# Patient Record
Sex: MALE | Race: OTHER | HISPANIC OR LATINO | URBAN - METROPOLITAN AREA
[De-identification: names, ages, dates, MRNs, and addresses within clinical notes are randomized per-mention and may not be internally consistent; named-entity substitution may affect disease eponyms.]

---

## 2019-07-09 ENCOUNTER — EMERGENCY (EMERGENCY)
Facility: HOSPITAL | Age: 19
LOS: 1 days | Discharge: HOME | End: 2019-07-09
Attending: INTERNAL MEDICINE | Admitting: INTERNAL MEDICINE
Payer: MEDICAID

## 2019-07-09 VITALS
DIASTOLIC BLOOD PRESSURE: 77 MMHG | WEIGHT: 172.18 LBS | OXYGEN SATURATION: 100 % | HEART RATE: 63 BPM | TEMPERATURE: 98 F | HEIGHT: 65 IN | SYSTOLIC BLOOD PRESSURE: 129 MMHG | RESPIRATION RATE: 18 BRPM

## 2019-07-09 DIAGNOSIS — R07.81 PLEURODYNIA: ICD-10-CM

## 2019-07-09 LAB
ALBUMIN SERPL ELPH-MCNC: 4.1 G/DL — SIGNIFICANT CHANGE UP (ref 3.3–5)
ALP SERPL-CCNC: 67 U/L — SIGNIFICANT CHANGE UP (ref 40–120)
ALT FLD-CCNC: 24 U/L DA — SIGNIFICANT CHANGE UP (ref 10–45)
ANION GAP SERPL CALC-SCNC: 8 MMOL/L — SIGNIFICANT CHANGE UP (ref 5–17)
APPEARANCE UR: CLEAR — SIGNIFICANT CHANGE UP
AST SERPL-CCNC: 18 U/L — SIGNIFICANT CHANGE UP (ref 10–40)
BACTERIA # UR AUTO: NEGATIVE /HPF — SIGNIFICANT CHANGE UP
BASOPHILS # BLD AUTO: 0.03 K/UL — SIGNIFICANT CHANGE UP (ref 0–0.2)
BASOPHILS NFR BLD AUTO: 0.2 % — SIGNIFICANT CHANGE UP (ref 0–2)
BILIRUB SERPL-MCNC: 0.5 MG/DL — SIGNIFICANT CHANGE UP (ref 0.2–1.2)
BILIRUB UR-MCNC: NEGATIVE — SIGNIFICANT CHANGE UP
BUN SERPL-MCNC: 15 MG/DL — SIGNIFICANT CHANGE UP (ref 7–23)
CALCIUM SERPL-MCNC: 9.4 MG/DL — SIGNIFICANT CHANGE UP (ref 8.4–10.5)
CHLORIDE SERPL-SCNC: 107 MMOL/L — SIGNIFICANT CHANGE UP (ref 96–108)
CO2 SERPL-SCNC: 26 MMOL/L — SIGNIFICANT CHANGE UP (ref 22–31)
COLOR SPEC: YELLOW — SIGNIFICANT CHANGE UP
CREAT SERPL-MCNC: 1.11 MG/DL — SIGNIFICANT CHANGE UP (ref 0.5–1.3)
DIFF PNL FLD: ABNORMAL
EOSINOPHIL # BLD AUTO: 0.06 K/UL — SIGNIFICANT CHANGE UP (ref 0–0.5)
EOSINOPHIL NFR BLD AUTO: 0.5 % — SIGNIFICANT CHANGE UP (ref 0–6)
EPI CELLS # UR: SIGNIFICANT CHANGE UP
GLUCOSE SERPL-MCNC: 95 MG/DL — SIGNIFICANT CHANGE UP (ref 70–99)
GLUCOSE UR QL: NEGATIVE — SIGNIFICANT CHANGE UP
HCT VFR BLD CALC: 43.2 % — SIGNIFICANT CHANGE UP (ref 39–50)
HGB BLD-MCNC: 14.8 G/DL — SIGNIFICANT CHANGE UP (ref 13–17)
IMM GRANULOCYTES NFR BLD AUTO: 0.4 % — SIGNIFICANT CHANGE UP (ref 0–1.5)
KETONES UR-MCNC: NEGATIVE — SIGNIFICANT CHANGE UP
LEUKOCYTE ESTERASE UR-ACNC: ABNORMAL
LIDOCAIN IGE QN: 120 U/L — SIGNIFICANT CHANGE UP (ref 73–393)
LYMPHOCYTES # BLD AUTO: 17.1 % — SIGNIFICANT CHANGE UP (ref 13–44)
LYMPHOCYTES # BLD AUTO: 2.07 K/UL — SIGNIFICANT CHANGE UP (ref 1–3.3)
MCHC RBC-ENTMCNC: 31.5 PG — SIGNIFICANT CHANGE UP (ref 27–34)
MCHC RBC-ENTMCNC: 34.3 GM/DL — SIGNIFICANT CHANGE UP (ref 32–36)
MCV RBC AUTO: 91.9 FL — SIGNIFICANT CHANGE UP (ref 80–100)
MONOCYTES # BLD AUTO: 0.5 K/UL — SIGNIFICANT CHANGE UP (ref 0–0.9)
MONOCYTES NFR BLD AUTO: 4.1 % — SIGNIFICANT CHANGE UP (ref 2–14)
NEUTROPHILS # BLD AUTO: 9.37 K/UL — HIGH (ref 1.8–7.4)
NEUTROPHILS NFR BLD AUTO: 77.7 % — HIGH (ref 43–77)
NITRITE UR-MCNC: NEGATIVE — SIGNIFICANT CHANGE UP
NRBC # BLD: 0 /100 WBCS — SIGNIFICANT CHANGE UP (ref 0–0)
PH UR: 6 — SIGNIFICANT CHANGE UP (ref 5–8)
PLATELET # BLD AUTO: 324 K/UL — SIGNIFICANT CHANGE UP (ref 150–400)
POTASSIUM SERPL-MCNC: 3.9 MMOL/L — SIGNIFICANT CHANGE UP (ref 3.5–5.3)
POTASSIUM SERPL-SCNC: 3.9 MMOL/L — SIGNIFICANT CHANGE UP (ref 3.5–5.3)
PROT SERPL-MCNC: 7.4 G/DL — SIGNIFICANT CHANGE UP (ref 6–8.3)
PROT UR-MCNC: NEGATIVE — SIGNIFICANT CHANGE UP
RBC # BLD: 4.7 M/UL — SIGNIFICANT CHANGE UP (ref 4.2–5.8)
RBC # FLD: 12.5 % — SIGNIFICANT CHANGE UP (ref 10.3–14.5)
RBC CASTS # UR COMP ASSIST: ABNORMAL /HPF (ref 0–4)
SODIUM SERPL-SCNC: 141 MMOL/L — SIGNIFICANT CHANGE UP (ref 135–145)
SP GR SPEC: 1.02 — SIGNIFICANT CHANGE UP (ref 1.01–1.02)
UROBILINOGEN FLD QL: NEGATIVE — SIGNIFICANT CHANGE UP
WBC # BLD: 12.08 K/UL — HIGH (ref 3.8–10.5)
WBC # FLD AUTO: 12.08 K/UL — HIGH (ref 3.8–10.5)
WBC UR QL: SIGNIFICANT CHANGE UP /HPF (ref 0–5)

## 2019-07-09 PROCEDURE — 74176 CT ABD & PELVIS W/O CONTRAST: CPT

## 2019-07-09 PROCEDURE — 80053 COMPREHEN METABOLIC PANEL: CPT

## 2019-07-09 PROCEDURE — 36415 COLL VENOUS BLD VENIPUNCTURE: CPT

## 2019-07-09 PROCEDURE — 99284 EMERGENCY DEPT VISIT MOD MDM: CPT | Mod: 25

## 2019-07-09 PROCEDURE — 96374 THER/PROPH/DIAG INJ IV PUSH: CPT

## 2019-07-09 PROCEDURE — 99284 EMERGENCY DEPT VISIT MOD MDM: CPT

## 2019-07-09 PROCEDURE — 74176 CT ABD & PELVIS W/O CONTRAST: CPT | Mod: 26

## 2019-07-09 PROCEDURE — 81001 URINALYSIS AUTO W/SCOPE: CPT

## 2019-07-09 PROCEDURE — 83690 ASSAY OF LIPASE: CPT

## 2019-07-09 PROCEDURE — 85027 COMPLETE CBC AUTOMATED: CPT

## 2019-07-09 PROCEDURE — 87086 URINE CULTURE/COLONY COUNT: CPT

## 2019-07-09 RX ORDER — KETOROLAC TROMETHAMINE 30 MG/ML
30 SYRINGE (ML) INJECTION ONCE
Refills: 0 | Status: DISCONTINUED | OUTPATIENT
Start: 2019-07-09 | End: 2019-07-09

## 2019-07-09 RX ORDER — SODIUM CHLORIDE 9 MG/ML
1000 INJECTION INTRAMUSCULAR; INTRAVENOUS; SUBCUTANEOUS ONCE
Refills: 0 | Status: COMPLETED | OUTPATIENT
Start: 2019-07-09 | End: 2019-07-09

## 2019-07-09 RX ORDER — SODIUM CHLORIDE 9 MG/ML
1000 INJECTION INTRAMUSCULAR; INTRAVENOUS; SUBCUTANEOUS ONCE
Refills: 0 | Status: DISCONTINUED | OUTPATIENT
Start: 2019-07-09 | End: 2019-07-09

## 2019-07-09 RX ADMIN — SODIUM CHLORIDE 1000 MILLILITER(S): 9 INJECTION INTRAMUSCULAR; INTRAVENOUS; SUBCUTANEOUS at 11:50

## 2019-07-09 RX ADMIN — Medication 30 MILLIGRAM(S): at 13:16

## 2019-07-09 NOTE — ED PROVIDER NOTE - CARE PROVIDER_API CALL
Telly Irvin)  Urology  [address]  [facility], [address]  [place], [place] [id number]  Phone: [phone]  Fax: [phone]  Follow Up Time:

## 2019-07-09 NOTE — ED PROVIDER NOTE - ATTENDING CONTRIBUTION TO CARE
18 yr old male with no pmhx presents c/o intermittent right flank pain for the 1 week, worsening today. Pt reports pain radiates from right flank to groin area. Denies any urinary complaints. Pt reports he took Tylenol this morning and then felt nausea, now resolved. Denies any fever, chills, sob or any other symptoms.   No hx of similar symptoms.  right flank tenderness -   positive blood in urine, ct stone hunt- shows kidney stone   pain improved in ED, fluids given  pt stable for discharge with urology follow up  Take motrin 600mg every 6 hours as needed for pain.   Drink plenty of fluids.   Follow up with urology.   Return if any worsening symptoms.

## 2019-07-09 NOTE — ED PROVIDER NOTE - CLINICAL SUMMARY MEDICAL DECISION MAKING FREE TEXT BOX
18 yr old male with no pmhx presents c/o intermittent right flank pain for the 1 week, worsening today. Pt reports pain radiates from right flank to groin area. Denies any urinary complaints. Pt reports he took Tylenol this morning and then felt nausea, now resolved. Denies any fever, chills, sob or any other symptoms.   No hx of similar symptoms.  right flank tenderness -   positive blood in urine, ct stone hunt- shows kidney stone   pain improved in ED, fluids given  pt stable for discharge with urology follow up

## 2019-07-09 NOTE — ED ADULT NURSE NOTE - OBJECTIVE STATEMENT
Pt presents to the ER complaining of having right flank pain that started today after doing push-ups at the gym. pain level 5/10. Pt denies any other symptoms.

## 2019-07-09 NOTE — ED PROVIDER NOTE - OBJECTIVE STATEMENT
18 yr old male with no pmhx presents c/o intermittent right flank pain for the last 2 weeks, worsening today. Pt reports pain radiates from right flank to groin area. Denies any urinary complaints. Pt reports he took Tylenol this morning and then felt nausea, now resolved. 18 yr old male with no pmhx presents c/o intermittent right flank pain for the 1 week, worsening today. Pt reports pain radiates from right flank to groin area. Denies any urinary complaints. Pt reports he took Tylenol this morning and then felt nausea, now resolved. Denies any fever, chills, sob or any other symptoms.   No hx of similar symptoms.

## 2019-07-09 NOTE — ED PROVIDER NOTE - NSFOLLOWUPINSTRUCTIONS_ED_ALL_ED_FT
Take motrin 600mg every 6 hours as needed for pain.   Drink plenty of fluids.   Follow up with urology.   Return if any worsening symptoms.       Cálculos renales    LO QUE NECESITA SABER:    Los cálculos renales se vanesa en el sistema urinario cuando el agua y los residuos de la orina no están merced balanceados. Cuando esto sucede, ciertos tipos de aislinn de desecho se separan de la orina. Los aislinn se acumulan y vanesa piedras en los riñones. Podría tener más de un cálculo.     INSTRUCCIONES SOBRE EL SAUD HOSPITALARIA:    Regrese a la jessica de emergencias si:     Usted tiene vómitos que no se alivian con medicación.        Comuníquese con lafleur médico si:     Usted tiene fiebre.       Usted tiene dificultad para orinar.      Usted orina con adalid.      Usted tiene dolor intenso.      Tiene alguna pregunta o inquietud acerca de lafleur condición o cuidado.    Medicamentos:     AINEs (Analgésicos antiinflamatorios no esteroides)rony el ibuprofeno, ayudan a disminuir la inflamación, el dolor y la fiebre. Malu medicamento esta disponible con o sin lloyd receta médica. Los AINEs pueden causar sangrado estomacal o problemas renales en ciertas personas. Si usted darlene un medicamento anticoagulante, siempre pregúntele a lafleur médico si los CHARLES son seguros para usted. Siempre mert la etiqueta de malu medicamento y siga las instrucciones.      Puede administrarse un medicamento recetado para el dolor. Pregunte al médico cómo debe shona malu medicamento de forma ferguson. Algunos medicamentos recetados para el dolor contienen acetaminofén. No tome otros medicamentos que contengan acetaminofén sin consultarlo con lafleur médico. Demasiado acetaminofeno puede causar daño al hígado. Los medicamentos recetados para el dolor podrían causar estreñimiento. Pregunte a lafleur médico rony prevenir o tratar estreñimiento.       Medicamentos para balancear el nivel de los electrolitos.       Hortense christianne medicamentos rony se le haya indicado. Consulte con lafleur médico si usted maurisio que lafleur medicamento no le está ayudando o si presenta efectos secundarios. Infórmele si es alérgico a cualquier medicamento. Mantenga lloyd lista actualizada de los medicamentos, las vitaminas y los productos herbales que darlene. Incluya los siguientes datos de los medicamentos: cantidad, frecuencia y motivo de administración. Traiga con usted la lista o los envases de la píldoras a christianne citas de seguimiento. Lleve la lista de los medicamentos con usted en josé de lloyd emergencia.    Acuda a christianne consultas de control con lafleur médico según le indicaron. Es posible que necesite regresar para que le realicen más exámenes. Anote christianne preguntas para que se acuerde de hacerlas bridget christianne visitas.    Lo que puede hacer para controlar los cálculos:     Ingiera más líquidos. Es probable que lafleur médico le indique que tome al menos 8 a 12 vasos (de ocho onzas) de líquidos al día. [place] ayuda a deshacerse de los cálculos renales cuando usted orina. El agua es el mejor líquido para shona.      Cuele la orina cada vez que use el baño. Orine por medio de un colador o un pedazo de chalino gutierrez para así recolectar los cálculos. Lleve los cálculos donde lafleur médico para que pueda enviarlos al laboratorio y realizarles exámenes. [place] ayudará a lafleur médico a planear el mejor tratamiento para usted.Buscar piedras en el filtro           Consuma alimentos saludables y variados. Los alimentos sanos incluyen frutas, vegetales, panes integrales, productos lácteos bajos en grasas, frijoles y pescado. Es probable que usted tenga que limitar la cantidad de sodio (sal) o proteínas que usted come. Pida más información sobre los mejores alimentos para usted.      Manténgase activo. Christianne cálculos pueden pasar con más facilidad si usted permanece activo. También puede ayudarle a controlar lafleur peso. Pregunte sobre cuáles son las mejores actividades para usted.    Después de eliminar los cálculos renales: [place] médico puede ordenar un examen de orina de 24 horas. Los resultados del examen de orina de 24 horas ayudarán a lafleur médico a planear las formas de evitar la formación de más cálculos. [place] médico le dará más instrucciones.

## 2019-07-10 LAB
CULTURE RESULTS: NO GROWTH — SIGNIFICANT CHANGE UP
SPECIMEN SOURCE: SIGNIFICANT CHANGE UP